# Patient Record
Sex: FEMALE | URBAN - METROPOLITAN AREA
[De-identification: names, ages, dates, MRNs, and addresses within clinical notes are randomized per-mention and may not be internally consistent; named-entity substitution may affect disease eponyms.]

---

## 2017-09-17 ENCOUNTER — HISTORICAL (OUTPATIENT)
Dept: ADMINISTRATIVE | Facility: HOSPITAL | Age: 47
End: 2017-09-17

## 2019-07-17 ENCOUNTER — HISTORICAL (OUTPATIENT)
Dept: ADMINISTRATIVE | Facility: HOSPITAL | Age: 49
End: 2019-07-17

## 2019-07-17 LAB
CHOLEST SERPL-MCNC: 214 MG/DL (ref 0–200)
CHOLEST/HDLC SERPL: 5.1 {RATIO}
HDLC SERPL-MCNC: 42 MG/DL (ref 35–60)
LDLC SERPL CALC-MCNC: 134 MG/DL (ref 0–129)
TRIGL SERPL-MCNC: 161 MG/DL (ref 30–150)
VLDLC SERPL CALC-MCNC: 32.2 MG/DL

## 2019-12-11 ENCOUNTER — HISTORICAL (OUTPATIENT)
Dept: ADMINISTRATIVE | Facility: HOSPITAL | Age: 49
End: 2019-12-11

## 2019-12-11 LAB
ABS NEUT (OLG): 7.3 X10(3)/MCL (ref 2.1–9.2)
ALBUMIN SERPL-MCNC: 4.7 GM/DL (ref 3.4–5)
ALBUMIN/GLOB SERPL: 1.96 {RATIO} (ref 1.5–2.5)
ALP SERPL-CCNC: 40 UNIT/L (ref 38–126)
ALT SERPL-CCNC: 12 UNIT/L (ref 7–52)
APPEARANCE, UA: CLEAR
AST SERPL-CCNC: 14 UNIT/L (ref 15–37)
BACTERIA #/AREA URNS AUTO: NORMAL /HPF
BILIRUB SERPL-MCNC: 0.7 MG/DL (ref 0.2–1)
BILIRUB UR QL STRIP: NEGATIVE MG/DL
BILIRUBIN DIRECT+TOT PNL SERPL-MCNC: 0.1 MG/DL (ref 0–0.5)
BILIRUBIN DIRECT+TOT PNL SERPL-MCNC: 0.6 MG/DL
BUN SERPL-MCNC: 15 MG/DL (ref 7–18)
CALCIUM SERPL-MCNC: 9.7 MG/DL (ref 8.5–10)
CHLORIDE SERPL-SCNC: 102 MMOL/L (ref 98–107)
CHOLEST SERPL-MCNC: 186 MG/DL (ref 0–200)
CHOLEST/HDLC SERPL: 3.2 {RATIO}
CO2 SERPL-SCNC: 30 MMOL/L (ref 21–32)
COLOR UR: YELLOW
CREAT SERPL-MCNC: 0.88 MG/DL (ref 0.6–1.3)
ERYTHROCYTE [DISTWIDTH] IN BLOOD BY AUTOMATED COUNT: 11.5 % (ref 11.5–17)
GLOBULIN SER-MCNC: 2.4 GM/DL (ref 1.2–3)
GLUCOSE (UA): NEGATIVE MG/DL
GLUCOSE SERPL-MCNC: 109 MG/DL (ref 74–106)
HCT VFR BLD AUTO: 39.1 % (ref 37–47)
HDLC SERPL-MCNC: 59 MG/DL (ref 35–60)
HGB BLD-MCNC: 13.5 GM/DL (ref 12–16)
HGB UR QL STRIP: NEGATIVE UNIT/L
KETONES UR QL STRIP: NEGATIVE MG/DL
LDLC SERPL CALC-MCNC: 109 MG/DL (ref 0–129)
LEUKOCYTE ESTERASE UR QL STRIP: NEGATIVE UNIT/L
LYMPHOCYTES # BLD AUTO: 3 X10(3)/MCL (ref 0.6–3.4)
LYMPHOCYTES NFR BLD AUTO: 28.3 % (ref 13–40)
MCH RBC QN AUTO: 30.9 PG (ref 27–31.2)
MCHC RBC AUTO-ENTMCNC: 34 GM/DL (ref 32–36)
MCV RBC AUTO: 90 FL (ref 80–94)
MONOCYTES # BLD AUTO: 0.2 X10(3)/MCL (ref 0.1–1.3)
MONOCYTES NFR BLD AUTO: 2 % (ref 0.1–24)
NEUTROPHILS NFR BLD AUTO: 69.7 % (ref 47–80)
NITRITE UR QL STRIP.AUTO: NEGATIVE
PH UR STRIP: 5.5 [PH]
PLATELET # BLD AUTO: 333 X10(3)/MCL (ref 130–400)
PMV BLD AUTO: 8.2 FL (ref 9.4–12.4)
POTASSIUM SERPL-SCNC: 4.8 MMOL/L (ref 3.5–5.1)
PROT SERPL-MCNC: 7.1 GM/DL (ref 6.4–8.2)
PROT UR QL STRIP: NEGATIVE MG/DL
RBC # BLD AUTO: 4.37 X10(6)/MCL (ref 4.2–5.4)
RBC #/AREA URNS HPF: NORMAL /HPF
SODIUM SERPL-SCNC: 138 MMOL/L (ref 136–145)
SP GR UR STRIP: 1.02
SQUAMOUS EPITHELIAL, UA: NORMAL /LPF
TRIGL SERPL-MCNC: 156 MG/DL (ref 30–150)
UROBILINOGEN UR STRIP-ACNC: 0.2 MG/DL
VLDLC SERPL CALC-MCNC: 31.2 MG/DL
WBC # SPEC AUTO: 10.5 X10(3)/MCL (ref 4.5–11.5)
WBC #/AREA URNS AUTO: NORMAL /[HPF]

## 2019-12-16 LAB
EST. AVERAGE GLUCOSE BLD GHB EST-MCNC: 103 MG/DL
HBA1C MFR BLD: 5.2 % (ref 4.4–6.4)

## 2019-12-30 ENCOUNTER — HISTORICAL (OUTPATIENT)
Dept: ADMINISTRATIVE | Facility: HOSPITAL | Age: 49
End: 2019-12-30

## 2019-12-30 LAB
APPEARANCE, UA: CLEAR
BACTERIA #/AREA URNS AUTO: NORMAL /HPF
BILIRUB UR QL STRIP: NEGATIVE MG/DL
COLOR UR: YELLOW
GLUCOSE (UA): NEGATIVE MG/DL
HGB UR QL STRIP: NEGATIVE UNIT/L
KETONES UR QL STRIP: NEGATIVE MG/DL
LEUKOCYTE ESTERASE UR QL STRIP: NEGATIVE UNIT/L
NITRITE UR QL STRIP.AUTO: NEGATIVE
PH UR STRIP: 7 [PH]
PROT UR QL STRIP: NEGATIVE MG/DL
RBC #/AREA URNS HPF: NORMAL /HPF
SP GR UR STRIP: 1.02
SQUAMOUS EPITHELIAL, UA: NORMAL /LPF
UROBILINOGEN UR STRIP-ACNC: 0.2 MG/DL
WBC #/AREA URNS AUTO: NORMAL /[HPF]

## 2022-02-22 ENCOUNTER — TELEPHONE (OUTPATIENT)
Dept: GASTROENTEROLOGY | Age: 52
End: 2022-02-22

## 2022-02-22 DIAGNOSIS — Z12.11 ENCOUNTER FOR SCREENING FOR MALIGNANT NEOPLASM OF COLON: Primary | ICD-10-CM

## 2022-02-22 RX ORDER — POLYETHYLENE GLYCOL 3350 17 G/17G
POWDER, FOR SOLUTION ORAL
Qty: 510 G | Refills: 0 | Status: SHIPPED | OUTPATIENT
Start: 2022-02-22 | End: 2022-03-29

## 2022-02-22 NOTE — TELEPHONE ENCOUNTER
Patient called about colon screen letter, after verified , we discussed scheduling the colonoscopy. Reviewed questionaire, she is ok to schedule. She chose 3-. Miralax pended to Dr Severiano Hamming to send to [address] [place] [address]. Orders and instructions mailed to patient. Notification sent to Dr Lorena Funez. No PA required per auto [employer] 2022. COVID TEST SCHEDULED FOR 3-. Orders routed to [facility] and [facility] Tomlinson.

## 2022-02-22 NOTE — LETTER
2022 11:39 AM    Ms. Gibran Voss  [address] [id number]    Dear Moose Valero MD     Thank you for referring your patient Gibran Voss , : 1970, to us for colonoscopy screening. In contacting the patient, she has been scheduled for 3-.         Sincerely,      Marcial Treviño MD

## 2022-03-25 ENCOUNTER — HOSPITAL ENCOUNTER (OUTPATIENT)
Dept: PREADMISSION TESTING | Age: 52
Discharge: HOME | End: 2022-03-25
Payer: COMMERCIAL

## 2022-03-25 LAB
FLUAV RNA SPEC QL NAA+PROBE: NOT DETECTED
FLUBV RNA SPEC QL NAA+PROBE: NOT DETECTED
SARS-COV-2, COV2: NOT DETECTED

## 2022-03-25 PROCEDURE — 87636 SARSCOV2 & INF A&B AMP PRB: CPT

## 2022-03-29 ENCOUNTER — ANESTHESIA (OUTPATIENT)
Dept: ENDOSCOPY | Age: 52
End: 2022-03-29
Payer: COMMERCIAL

## 2022-03-29 ENCOUNTER — HOSPITAL ENCOUNTER (OUTPATIENT)
Age: 52
Setting detail: OUTPATIENT SURGERY
Discharge: HOME | End: 2022-03-29
Attending: INTERNAL MEDICINE | Admitting: INTERNAL MEDICINE
Payer: COMMERCIAL

## 2022-03-29 ENCOUNTER — ANESTHESIA EVENT (OUTPATIENT)
Dept: ENDOSCOPY | Age: 52
End: 2022-03-29
Payer: COMMERCIAL

## 2022-03-29 VITALS
BODY MASS INDEX: 33.32 KG/M2 | RESPIRATION RATE: 20 BRPM | HEART RATE: 77 BPM | OXYGEN SATURATION: 98 % | TEMPERATURE: 98.7 F | SYSTOLIC BLOOD PRESSURE: 139 MMHG | HEIGHT: 65 IN | WEIGHT: 200 LBS | DIASTOLIC BLOOD PRESSURE: 82 MMHG

## 2022-03-29 LAB
GLUCOSE BLD STRIP.AUTO-MCNC: 128 MG/DL (ref 65–117)
HCG UR QL: NEGATIVE
PERFORMED BY, TECHID: ABNORMAL

## 2022-03-29 PROCEDURE — 76060000032 HC ANESTHESIA 0.5 TO 1 HR: Performed by: INTERNAL MEDICINE

## 2022-03-29 PROCEDURE — 81025 URINE PREGNANCY TEST: CPT

## 2022-03-29 PROCEDURE — 74011250636 HC RX REV CODE- 250/636: Performed by: INTERNAL MEDICINE

## 2022-03-29 PROCEDURE — 2709999900 HC NON-CHARGEABLE SUPPLY: Performed by: INTERNAL MEDICINE

## 2022-03-29 PROCEDURE — 74011250636 HC RX REV CODE- 250/636: Performed by: NURSE ANESTHETIST, CERTIFIED REGISTERED

## 2022-03-29 PROCEDURE — 45378 DIAGNOSTIC COLONOSCOPY: CPT | Performed by: INTERNAL MEDICINE

## 2022-03-29 PROCEDURE — 82962 GLUCOSE BLOOD TEST: CPT

## 2022-03-29 PROCEDURE — 76040000007: Performed by: INTERNAL MEDICINE

## 2022-03-29 PROCEDURE — 74011250636 HC RX REV CODE- 250/636

## 2022-03-29 RX ORDER — SODIUM CHLORIDE 0.9 % (FLUSH) 0.9 %
5-40 SYRINGE (ML) INJECTION EVERY 8 HOURS
Status: DISCONTINUED | OUTPATIENT
Start: 2022-03-29 | End: 2022-03-29 | Stop reason: HOSPADM

## 2022-03-29 RX ORDER — GUAIFENESIN 100 MG/5ML
81 LIQUID (ML) ORAL DAILY
COMMUNITY

## 2022-03-29 RX ORDER — CITALOPRAM 40 MG/1
40 TABLET, FILM COATED ORAL DAILY
COMMUNITY

## 2022-03-29 RX ORDER — PROPOFOL 10 MG/ML
INJECTION, EMULSION INTRAVENOUS AS NEEDED
Status: DISCONTINUED | OUTPATIENT
Start: 2022-03-29 | End: 2022-03-29 | Stop reason: HOSPADM

## 2022-03-29 RX ORDER — SODIUM CHLORIDE 9 MG/ML
INJECTION, SOLUTION INTRAVENOUS
Status: DISCONTINUED | OUTPATIENT
Start: 2022-03-29 | End: 2022-03-29 | Stop reason: HOSPADM

## 2022-03-29 RX ORDER — SODIUM CHLORIDE 0.9 % (FLUSH) 0.9 %
5-40 SYRINGE (ML) INJECTION AS NEEDED
Status: DISCONTINUED | OUTPATIENT
Start: 2022-03-29 | End: 2022-03-29 | Stop reason: HOSPADM

## 2022-03-29 RX ORDER — METFORMIN HYDROCHLORIDE 500 MG/1
TABLET ORAL 2 TIMES DAILY WITH MEALS
COMMUNITY

## 2022-03-29 RX ORDER — BISMUTH SUBSALICYLATE 262 MG
1 TABLET,CHEWABLE ORAL DAILY
COMMUNITY

## 2022-03-29 RX ORDER — MIDAZOLAM HYDROCHLORIDE 1 MG/ML
INJECTION, SOLUTION INTRAMUSCULAR; INTRAVENOUS AS NEEDED
Status: DISCONTINUED | OUTPATIENT
Start: 2022-03-29 | End: 2022-03-29 | Stop reason: HOSPADM

## 2022-03-29 RX ORDER — SODIUM CHLORIDE 9 MG/ML
125 INJECTION, SOLUTION INTRAVENOUS CONTINUOUS
Status: DISCONTINUED | OUTPATIENT
Start: 2022-03-29 | End: 2022-03-29 | Stop reason: HOSPADM

## 2022-03-29 RX ORDER — SIMVASTATIN 20 MG/1
TABLET, FILM COATED ORAL
COMMUNITY

## 2022-03-29 RX ADMIN — PROPOFOL 50 MG: 10 INJECTION, EMULSION INTRAVENOUS at 10:41

## 2022-03-29 RX ADMIN — MIDAZOLAM 2 MG: 1 INJECTION INTRAMUSCULAR; INTRAVENOUS at 10:32

## 2022-03-29 RX ADMIN — MIDAZOLAM 2 MG: 1 INJECTION INTRAMUSCULAR; INTRAVENOUS at 10:25

## 2022-03-29 RX ADMIN — SODIUM CHLORIDE: 9 INJECTION, SOLUTION INTRAVENOUS at 10:32

## 2022-03-29 RX ADMIN — PROPOFOL 100 MG: 10 INJECTION, EMULSION INTRAVENOUS at 10:36

## 2022-03-29 RX ADMIN — PROPOFOL 50 MG: 10 INJECTION, EMULSION INTRAVENOUS at 10:49

## 2022-03-29 RX ADMIN — SODIUM CHLORIDE 125 ML/HR: 9 INJECTION, SOLUTION INTRAVENOUS at 10:09

## 2022-03-29 NOTE — ANESTHESIA POSTPROCEDURE EVALUATION
Procedure(s):  COLONOSCOPY (TIVA). total IV anesthesia    Anesthesia Post Evaluation      Multimodal analgesia: multimodal analgesia not used between 6 hours prior to anesthesia start to PACU discharge  Patient location during evaluation: PACU  Patient participation: complete - patient participated  Pain management: adequate  Airway patency: patent  Anesthetic complications: no  Cardiovascular status: acceptable and stable  Respiratory status: acceptable and room air  Hydration status: acceptable  Post anesthesia nausea and vomiting:  none  Final Post Anesthesia Temperature Assessment:  Normothermia (36.0-37.5 degrees C)      INITIAL Post-op Vital signs: No vitals data found for the desired time range.

## 2022-03-29 NOTE — DISCHARGE INSTRUCTIONS

## 2022-03-29 NOTE — OP NOTES
Colonoscopy Procedure Note      Patient: Sumner Duane MRN: [id number]  SSN: [id number]    YOB: 1970  Age: 50 y.o. Sex: female      Date of Procedure: 3/29/2022  Date/Time:  3/29/2022 10:59 AM       IMPRESSION:     1. Normal colon to level of cecum. RECOMMENDATIONS:     1) Repeat colonoscopy in 10 years. INDICATION: Colon screening     PROCEDURE PERFORMED: Colonoscopy     DESCRIPTION OF PROCEDURE: An informed consent was obtained. The patient was placed in left lateral position. Perianal inspection and a digital rectal exam was performed. Video colonoscope was introduced into the rectum and advanced under direct vision up to the terminal ileum. With adequate insufflation and maneuvering of the withdrawing scope, the colonic mucosa was visualized carefully. Retroflexion was performed in the rectum to see the anorectum and also in the ascending colon to look behind the folds. Vital signs, pulse oximetry, single lead cardiac monitor were monitored throughout the procedure as the sedation was titrated to the desired effect ensuring patient comfort and safety. The patient tolerated the procedure very well and was transferred to the recovery area. Following is the summary of findings: No mucosal lesions were noted to the level of the cecum. ENDOSCOPIST: Chaya Murillo MD        ENDOSCOPE: Olympus video colonoscope     ASSISTANT:Circ-1: Gail Morel              Scrub Tech-2: Erika Gonzalez              Endoscopy RN-1: Anshul Garsia RN     ANESTHESIA: TIVA      QUALITY OF PREPARATION: Good      FINDINGS:   1.  Normal colon to cecum       Complications: None     EBL: None     SPECIMENS: * No specimens in log Goran Gunn MD  March 29, 2022  10:59 AM

## 2022-03-29 NOTE — ANESTHESIA PREPROCEDURE EVALUATION
Relevant Problems   No relevant active problems       Anesthetic History   No history of anesthetic complications  Other anesthesia complications          Review of Systems / Medical History  Patient summary reviewed, nursing notes reviewed and pertinent labs reviewed    Pulmonary  Within defined limits                 Neuro/Psych         Psychiatric history     Cardiovascular  Within defined limits                  Comments: anxiety    GI/Hepatic/Renal  Within defined limits              Endo/Other    Diabetes         Other Findings              Physical Exam    Airway  Mallampati: II    Neck ROM: normal range of motion        Cardiovascular    Rhythm: regular  Rate: normal         Dental  No notable dental hx       Pulmonary  Breath sounds clear to auscultation               Abdominal  Abdominal exam normal       Other Findings            Anesthetic Plan    ASA: 2  Anesthesia type: total IV anesthesia            Anesthetic plan and risks discussed with: Patient

## 2022-04-07 ENCOUNTER — HISTORICAL (OUTPATIENT)
Dept: ADMINISTRATIVE | Facility: HOSPITAL | Age: 52
End: 2022-04-07

## 2022-04-24 VITALS
BODY MASS INDEX: 31.96 KG/M2 | SYSTOLIC BLOOD PRESSURE: 120 MMHG | OXYGEN SATURATION: 97 % | DIASTOLIC BLOOD PRESSURE: 64 MMHG | WEIGHT: 191.81 LBS | HEIGHT: 65 IN

## 2022-05-01 NOTE — HISTORICAL [facility] CERNER
This is a historical note converted from Bri. Formatting and pictures may have been removed.  Please reference Bri for original formatting and attached multimedia. Chief Complaint  ANNUAL WELLNESS PHYSICAL- NPO  History of Present Illness  Pt presents for Wellness exam.   with a 7 year old daughter.   at [employer].  Her mother passed away at age 73.  She has been feeling well.  Her appetite is good.  She sleeps well.  No alcohol.  No tobacco.  She has 2 cups of coffee on weekends.  She has not been exercising.  She does not have a gynecologist; she had a pap and mammogram prior to moving back last year. Her cycles are normal. She states her cramping is a little worse.  She got a flu shot.  Last Tdap: 2012.  Review of Systems  Constitutional:?no?weight gain,?no?weight loss,?no?fatigue, ?no?fever, ?no?chills, ?no?weakness, ?no?trouble sleeping  Eyes: ?no?vision loss/changes,?+?glasses, readers,??no?pain,??no?redness,??no?blurry or double vision,??no?flashing lights,??no?glaucoma,??no?cataracts  Last eye exam:?last month  Neck: ?no?lymphadenopathy,??no?thyroid abnormalities,??no?bruits,??no?stiffness  Ears:?no?decreased hearing,??no?tinnitus,??no?earache,??no?drainage?  Nose:?no?congestion,??no?rhinorrhea,??no?epistaxis,??no?sinus pressure  Throat/Oral:?no?sore throat,??no?hoarseness, ?no?dental caries,??no?gum bleeding,??no?oral lesions  Cardiovascular:?no?chest pain, palpitations, or tightness,?no?dyspnea with exertion,??no?orthopnea,??no?paroxysmal nocturnal dyspnea  Respiratory:?no?cough,?no?sputum,??no?hemoptysis,??no?dyspnea,??no?wheezing,??no?pleuritic chest pain?  Gastrointestinal:?no?abdominal pain,?no?nausea,?no?vomiting,??no?heartburn,??no?dysphagia or odynophagia,??no?diarrhea,??no?constipation,??no?melena,??no?hematochezia,?no?jaundice  Urinary:?no?frequency,??no?urgency,??no?burning or pain,?no?hematuria,??no?incontinence,??no?hesitancy,??no?incomplete voiding,??no?flank  pain,??no?nocturia  Musculoskeletal:?no?myalgias,?no?arthralgias,?no?neck pain,??no?back pain,??no?swelling of extremities  Skin:?no?rashes,?no?sores,?no?non-healing wounds  Neurologic:?no?headaches,?no?dizziness/lightheadedness,?no?tremors,?no?paresthesias,??no?seizures,??no?muscle weakness  Psychiatric:?no?depression/sadness,?no?anhedonia,?no?irritability,?no?suicidal ideations,?no?anxiety,?no?panic attacks  Endocrine:?no?hot or cold intolerance,?+?nite?sweats,?no?polyuria,?no?polydipsia,?no?polyphagia  Hematologic:?no?bruising,??no?bleeding disorders?  Physical Exam  Vitals & Measurements  HR:?84(Peripheral)? BP:?100/60?  HT:?165?cm? WT:?82.9?kg? BMI:?30.45? LMP:?11/12/2019 00:00 CST?  PHYSICAL EXAMINATION:  GENERAL: The patient is a well-developed, well-nourished female in no?apparent distress. She is alert and oriented x 4.  HEENT: Head is normocephalic and atraumatic. Extraocular muscles are intact. Pupils are equal, round, and reactive to light and accommodation. Nares appeared normal. Mouth is well hydrated and without lesions. Mucous membranes are moist. Posterior pharynx clear of any exudate or lesions.  NECK: Supple. No carotid bruits.? No lymphadenopathy or thyromegaly.  LUNGS: Clear to auscultation.  HEART: Regular rate and rhythm without murmurs, rubs or gallops.  ABDOMEN: Soft, nontender, and nondistended.? Positive bowel sounds.? No hepatosplenomegaly was noted.  EXTREMITIES: Without any cyanosis, clubbing, rash, lesions or edema.  NEUROLOGIC: Cranial nerves II through XII are grossly intact.? No motor or sensory deficits.? Cerebellar function intact.  SKIN: No ulceration or induration present.  ?  ?  Assessment/Plan  1.?Wellness examination?Z00.00  Patient has been doing well. ?She is without complaints or concerns.  She has gotten a flu shot.  I advised patient to be more physically active and to exercise.  I advised her to lose weight.  Labs pending.  ?  Ordered:  CBC w/ Auto Diff, Routine  collect, 12/11/19 7:51:00 CST, Blood, Order for future visit, Stop date 12/11/19 7:51:00 CST, Lab Collect, Wellness examination, 12/11/19 7:51:00 CST  Clinic Follow-Up Wellness, *Est. 12/11/20 3:00:00 CST, Order for future visit, Wellness examination, [facility]  Comprehensive Metabolic Panel, Routine collect, 12/11/19 7:51:00 CST, Blood, Order for future visit, Stop date 12/11/19 7:51:00 CST, Lab Collect, Wellness examination, 12/11/19 7:51:00 CST  Lipid Panel, Routine collect, 12/11/19 7:51:00 CST, Blood, Order for future visit, Stop date 12/11/19 7:51:00 CST, Lab Collect, Wellness examination  High cholesterol, 12/11/19 7:51:00 CST  Preventative Health Care Est 40-64 years [id number] PC, Wellness examination  High cholesterol  Anxiety  Acne, [facility] - [facility], 12/11/19 7:51:00 CST  Urinalysis Complete no reflex, Routine collect, Urine, Order for future visit, 12/11/19 7:51:00 CST, Stop date 12/11/19 7:51:00 CST, Nurse collect, Wellness examination  ?  2.?High cholesterol?E78.00  ?Patient relates she is actually on simvastatin 20?mg;?she had told her she was on 40 mg.  Check lipid profile today.  Ordered:  Lipid Panel, Routine collect, 12/11/19 7:51:00 CST, Blood, Order for future visit, Stop date 12/11/19 7:51:00 CST, Lab Collect, Wellness examination  High cholesterol, 12/11/19 7:51:00 CST  Preventative Health Care Est 40-64 years 97308 PC, Wellness examination  High cholesterol  Anxiety  Acne, [facility] - [facility], 12/11/19 7:51:00 CST  ?  3.?Anxiety?F41.9  ?Stable on medication.  Ordered:  Preventative Health Care Est 40-64 years [id number] PC, Wellness examination  High cholesterol  Anxiety  Acne, [facility] - [facility], 12/11/19 7:51:00 CST  ?  4.?Acne?L70.9  ?Patient sees someone at [facility] dermatology.  She is being weaned off of?doxycycline.  Ordered:  Preventative Health Care Est 40-64 years [id number] PC, Wellness examination  High cholesterol  Anxiety  Acne, [facility] - [facility], 12/11/19 7:51:00  CST  ?  Orders:  citalopram, 10 mg = 1 tab(s), Oral, Daily, # 30 tab(s), 11 Refill(s), Pharmacy: [facility]/pharmacy #[id number]  simvastatin, 20 mg = 1 tab(s), Oral, Once a day (at bedtime), # 30 tab(s), 11 Refill(s), Pharmacy: [facility]/pharmacy #[id number]  Lab Collection Request, 19 7:51:00 CST, [facility] - [facility], 19 7:51:00 CST  She continues to take a baby aspirin.? She has a history of a DVT/PE?in early 2018.  Referrals  Clinic Follow-Up Wellness, *Est. 20 3:00:00 CST, Order for future visit, Wellness examination, ink AFP   Problem List/Past Medical History  Ongoing  Anxiety  High cholesterol  Obesity  Historical  No qualifying data  Procedure/Surgical History   section  Tonsillectomy   Medications  aspirin 81 mg oral Delayed Release (EC) tablet, 81 mg= 1 tab(s), Oral, Daily  Calcium and Magnesium oral tablet, 1 tab(s), Oral, Daily  citalopram 10 mg oral tablet, 10 mg= 1 tab(s), Oral, Daily, 11 refills  melatonin 10 mg oral tablet, extended release, 10 mg= 1 tab(s), Oral, Once a day (at bedtime), PRN  multivitamin with minerals (Adult Tab), 1 tab(s), Oral, Daily  Oracea 40 mg oral delayed release capsule, 40 mg= 1 cap(s), Oral, Once  simvastatin 20 mg oral tablet, 20 mg= 1 tab(s), Oral, Once a day (at bedtime), 11 refills  Allergies  penicillin?(rash)  Social History  Abuse/Neglect  No, 2019  No, 2019  Alcohol  Employment/School  Employed, Work/School description: ., 2019  Exercise  Home/Environment  Lives with Children, Spouse. Living situation: Home/Independent., 2019  Nutrition/Health  Regular, Fair, 2019  IDEAL PROTEIN, Good, Diet restrictions: 900-1,000 CALORIES PER DAY., 2019  Substance Use  Tobacco  Never (less than 100 in lifetime), N/A, 2019  Never (less than 100 in lifetime), N/A, 2019  Never smoker, 2017  Family History  Diabetes mellitus type 2: Father.  Health Maintenance  Health Maintenance  ???Pending?(in the next  year)  ??? ??OverDue  ??? ? ? ?Alcohol Misuse Screening due??01/01/19??and every 1??year(s)  ??? ??Due?  ??? ? ? ?ADL Screening due??12/11/19??and every 1??year(s)  ??? ? ? ?Tetanus Vaccine due??12/11/19??and every 10??year(s)  ??? ??Due In Future?  ??? ? ? ?Obesity Screening not due until??01/01/20??and every 1??year(s)  ???Satisfied?(in the past 1 year)  ??? ??Satisfied?  ??? ? ? ?Obesity Screening on??12/11/19.??Satisfied by Sahara Frye LPN  ?

## 2022-05-01 NOTE — HISTORICAL [facility] CERNER
This is a historical note converted from [employer]ner. Formatting and pictures may have been removed.  Please reference [place] for original formatting and attached multimedia. Chief Complaint  rt foot arch pain - no trauma x 1 day  History of Present Illness  Patient complains of?2 days of right foot arch pain, denies trauma or prior injury to this area, reports pain is worse with first steps in the morning,?wears flip-flops regularly and took Excedrin with mild improvement of pain.  Review of Systems  Constitutional_no fever, no fatigue  Musculoskeletal_no body aches, right foot pain  Integumentary_no pruritus, no swelling  Neuro_no dizziness, no weakness  ?  Physical Exam  Vitals & Measurements  T:?37.5? ?C ?(Oral)? HR:?93?(Peripheral)? RR:?18? BP:?131/88? SpO2:?97%?  HT:?165?cm? HT:?165?cm? WT:?90.2?kg? WT:?90.2?kg? BMI:?33.13?  General_well-developed well-nourished in no acute distress  Eye_clear conjunctiva, eyelids normal  Respiratory_clear to auscultation bilaterally, equal bilateral chest rise  Musculoskeletal_no deformity or atrophy noted, tenderness with palpation of right foot arc, no ankle tenderness,?swelling or erythema foot, bilateral?dorsalis pedis pulses 2+  Integumentary_no injury, no rashes  Neuro_alert and oriented ?4, normal gait  Assessment/Plan  1.?Plantar fasciitis of right foot  ? Instructed on?stretching exercises, cold compresses, and medication?as needed for pain?as well as proper foot wear. ?If no significant improvement over the next 1-2 weeks recommend follow-up?with primary care or our facility for further management.  Right foot pain  Ordered:  XR Foot Right Minimum 3 Views  Orders:  meloxicam, 15 mg = 1 tab(s), Oral, Daily, PRN PRN pain, As needed for pain/inflammation, may cause gastric side effects, # 14 tab(s), 2 Refill(s), Pharmacy: [facility]   Problem List/Past Medical History  Ongoing  High cholesterol  Obesity  Historical  Procedure/Surgical History    section  Tonsillectomy  Medications  citalopram 10 mg oral tablet, 10 mg= 1 tab(s), Oral, Daily  meloxicam 15 mg oral tablet, 15 mg= 1 tab(s), Oral, Daily, PRN, 2 refills  simvastatin, Oral, Once a day (at bedtime)  Allergies  penicillin?(rash)  Social History  Tobacco - 09/17/2017  Never smoker  Family History  Diabetes mellitus type 2: Father.

## 2022-05-01 NOTE — HISTORICAL [facility] CERNER
This is a historical note converted from Cerner. Formatting and pictures may have been removed.  Please reference [place] for original formatting and attached multimedia. Chief Complaint  LOWER ABDOMINAL PRESSURE. ?NO OTHER SYMPTOMS.  History of Present Illness  She reports she has been having lower abdominal pressure x 1 week. She thought it was gas. Intermittent in nature. Yesterday she felt a lot of pressure when she sat down.  No dysuria, frequency, hematuria or problems voiding.  No change in bowel movements; her last bowel movement was this am. No nausea/vomiting. Appetite is normal.  Her cycles are monthly. Last cycle was at her last office visit. No discharge.  Review of Systems  See HPI.  Physical Exam  Vitals & Measurements  T:?37? ?C (Oral)? HR:?72(Peripheral)? BP:?120/64?  HT:?165?cm? WT:?87?kg? BMI:?31.96? LMP:?12/16/2019 00:00 CST?  General: She is a well-developed well-nourished pleasant white female no apparent distress.  Chest: Clear to auscultation bilaterally.  CV: Regular rate and rhythm without murmurs rubs or gallops.  Abdomen:?Soft, no distention,?normal active bowel sounds,?diffusely tender?over lower abdominal?pelvic region.? No guarding/rebound.  ?  Assessment/Plan  1.?Lower abdominal pain?R10.30  ?Patient is having lower abdominal/pelvic pressure.? Her pain does not localize on examination.? She is tender throughout her lower abdominal?region.? She has no GI or  symptomatology.? She has no fever or chills.? Her urinalysis is normal.  We will schedule a pelvic ultrasound.  ER precautions given for worsening pain, fever/chills,?change in bowel movements,?melena or hematochezia,?vaginal bleeding, etc.  Ordered:  Office/Outpatient Visit Level 3 Established [id number] PC, Lower abdominal pain, [facility] - [facility], 12/30/19 15:18:00 CST  Schedule Diagnostics Study, pelvic ultrasound, next available [facility], 12/30/19 15:18:00 CST, Lower abdominal pain  ?  Orders:  citalopram, 40 mg = 1 tab(s), Oral,  Daily, # 90 tab(s), 3 Refill(s), Pharmacy: [facility]/pharmacy #[id number], 165, cm, Height/Length Dosing, 19 14:41:00 CST, 87, kg, Weight Dosing, 19 14:41:00 CST  Clinic Follow-up PRN, 19 15:18:00 CST, [facility] - [facility], Future Order  Referrals  Clinic Follow-up PRN, 19 15:18:00 CST, [facility] AMB - [facility], Future Order   Problem List/Past Medical History  Ongoing  Anxiety  High cholesterol  Obesity  Historical  No qualifying data  Procedure/Surgical History   section  Tonsillectomy   Medications  aspirin 81 mg oral Delayed Release (EC) tablet, 81 mg= 1 tab(s), Oral, Daily  Calcium and Magnesium oral tablet, 1 tab(s), Oral, Daily  citalopram 40 mg oral tablet, 40 mg= 1 tab(s), Oral, Daily, 3 refills  melatonin 10 mg oral tablet, extended release, 10 mg= 1 tab(s), Oral, Once a day (at bedtime), PRN  Oracea 40 mg oral delayed release capsule, 40 mg= 1 cap(s), Oral, Once  simvastatin 20 mg oral tablet, 20 mg= 1 tab(s), Oral, Once a day (at bedtime), 11 refills  Allergies  penicillin?(rash)  Social History  Abuse/Neglect  No, 2019  No, 2019  No, 2019  Alcohol  Employment/School  Employed, Work/School description: ., 2019  Exercise  Home/Environment  Lives with Children, Spouse. Living situation: Home/Independent., 2019  Nutrition/Health  Regular, Fair, 2019  IDEAL PROTEIN, Good, Diet restrictions: 900-1,000 CALORIES PER DAY., 2019  Substance Use  Tobacco  Never (less than 100 in lifetime), N/A, 2019  Never (less than 100 in lifetime), N/A, 2019  Never (less than 100 in lifetime), N/A, 2019  Never smoker, 2017  Family History  Diabetes mellitus type 2: Father.  Health Maintenance  Health Maintenance  ???Pending?(in the next year)  ??? ??OverDue  ??? ? ? ?Diabetes Screening due??and every?  ??? ? ? ?Cervical Cancer Screening due??14??and every 3??year(s)  ??? ? ? ?Alcohol Misuse Screening due??19??and every  1??year(s)  ??? ??Due?  ??? ? ? ?ADL Screening due??12/30/19??and every 1??year(s)  ??? ? ? ?Depression Screening due??12/30/19??and every?  ??? ? ? ?Influenza Vaccine due??12/30/19??and every?  ??? ? ? ?Tetanus Vaccine due??12/30/19??and every 10??year(s)  ??? ??Due In Future?  ??? ? ? ?Obesity Screening not due until??01/01/20??and every 1??year(s)  ??? ? ? ?Blood Pressure Screening not due until??12/29/20??and every 1??year(s)  ??? ? ? ?Body Mass Index Check not due until??12/29/20??and every 1??year(s)  ???Satisfied?(in the past 1 year)  ??? ??Satisfied?  ??? ? ? ?Blood Pressure Screening on??12/30/19.??Satisfied by Sahara Frye LPN  ??? ? ? ?Body Mass Index Check on??12/30/19.??Satisfied by Sahara Frye LPN  ??? ? ? ?Diabetes Screening on??12/16/19.??Satisfied by Windy Shah  ??? ? ? ?Lipid Screening on??12/11/19.??Satisfied by Ricky Whyte  ??? ? ? ?Obesity Screening on??12/30/19.??Satisfied by Sahara Frye LPN  ?  Lab Results  Test Name Test Result Date/Time   UA Appear Clear 12/30/2019 14:47 CST   UA Color Yellow 12/30/2019 14:47 CST   UA Spec Grav 1.020 12/30/2019 14:47 CST   UA Bili Negative UA 12/30/2019 14:47 CST   UA pH 7.0 12/30/2019 14:47 CST   UA Urobilinogen 0.2 12/30/2019 14:47 CST   UA Blood Negative UA 12/30/2019 14:47 CST   UA Glucose Negative UA 12/30/2019 14:47 CST   UA Ketones Negative UA 12/30/2019 14:47 CST   UA Protein Negative UA 12/30/2019 14:47 CST   UA Nitri Negative 12/30/2019 14:47 CST   UA Leuk Est Negative UA 12/30/2019 14:47 CST   UA WBC cnt 0-2 12/30/2019 14:47 CST   UA RBC None Seen 12/30/2019 14:47 CST   UA Bact None Seen 12/30/2019 14:47 CST   UA Squam Epithelial Rare 12/30/2019 14:47 CST

## (undated) DEVICE — 1200CC GUARDIAN II: Brand: GUARDIAN

## (undated) DEVICE — GLOVE ORANGE PI 7 1/2   MSG9075

## (undated) DEVICE — WASH CLOTH INCONT LO LINT PREM 12X13 IN LF DISP

## (undated) DEVICE — JELLY,LUBE,STERILE,FLIP TOP,TUBE,4-OZ: Brand: MEDLINE

## (undated) DEVICE — PAD,PREPPING,CUFFED,24X48,7",NONSTERILE: Brand: MEDLINE

## (undated) DEVICE — TUBING, SUCTION, 9/32" X 10', STRAIGHT: Brand: MEDLINE

## (undated) DEVICE — SPONGE GZ W4XL4IN COT 12 PLY TYP VII WVN C FLD DSGN

## (undated) DEVICE — SOLUTION IRRIG 1000ML STRL H2O USP PLAS POUR BTL